# Patient Record
Sex: FEMALE | HISPANIC OR LATINO | URBAN - METROPOLITAN AREA
[De-identification: names, ages, dates, MRNs, and addresses within clinical notes are randomized per-mention and may not be internally consistent; named-entity substitution may affect disease eponyms.]

---

## 2020-02-14 ENCOUNTER — OFFICE VISIT (OUTPATIENT)
Dept: URBAN - METROPOLITAN AREA CLINIC 11 | Facility: CLINIC | Age: 80
End: 2020-02-14
Payer: COMMERCIAL

## 2020-02-14 DIAGNOSIS — H26.493 OTHER SECONDARY CATARACT, BILATERAL: Primary | ICD-10-CM

## 2020-02-14 DIAGNOSIS — H04.123 DRY EYE SYNDROME OF BILATERAL LACRIMAL GLANDS: ICD-10-CM

## 2020-02-14 PROCEDURE — 92004 COMPRE OPH EXAM NEW PT 1/>: CPT | Performed by: OPTOMETRIST

## 2020-02-14 ASSESSMENT — INTRAOCULAR PRESSURE
OD: 15
OS: 16

## 2020-02-14 ASSESSMENT — VISUAL ACUITY
OS: 20/50
OD: 20/40

## 2020-02-14 ASSESSMENT — KERATOMETRY
OS: 45.00
OD: 2285.63

## 2020-02-14 NOTE — IMPRESSION/PLAN
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 established, stable Plan: Discussed diagnosis with pt. No treatment recommended at this time. Will continue to monitor. Pt to call if VA worsens. 
instructed patient to buy OTC readers +2.00 to help with NVA -

## 2020-02-14 NOTE — IMPRESSION/PLAN
Impression: Dry eye syndrome of bilateral lacrimal glands: H04.123. OU. Plan: Discussed condition with patient. Recommend frequent use of artificial tears for comfort. samples of systane complete given today.

## 2024-05-23 ENCOUNTER — OFFICE VISIT (OUTPATIENT)
Dept: URBAN - METROPOLITAN AREA CLINIC 40 | Facility: CLINIC | Age: 84
End: 2024-05-23
Payer: COMMERCIAL

## 2024-05-23 DIAGNOSIS — H43.811 VITREOUS DEGENERATION, RIGHT EYE: ICD-10-CM

## 2024-05-23 DIAGNOSIS — H04.123 DRY EYE SYNDROME OF BILATERAL LACRIMAL GLANDS: ICD-10-CM

## 2024-05-23 DIAGNOSIS — H52.4 PRESBYOPIA: ICD-10-CM

## 2024-05-23 DIAGNOSIS — H26.493 OTHER SECONDARY CATARACT, BILATERAL: Primary | ICD-10-CM

## 2024-05-23 PROCEDURE — 92004 COMPRE OPH EXAM NEW PT 1/>: CPT | Performed by: OPTOMETRIST

## 2024-05-23 ASSESSMENT — KERATOMETRY
OD: 45.25
OS: 45.00

## 2024-05-23 ASSESSMENT — VISUAL ACUITY
OD: 20/40
OS: 20/40

## 2024-05-23 ASSESSMENT — INTRAOCULAR PRESSURE
OD: 16
OS: 16